# Patient Record
(demographics unavailable — no encounter records)

---

## 2024-12-03 NOTE — REASON FOR VISIT
[Arrhythmia/ECG Abnorrmalities] : arrhythmia/ECG abnormalities [Structural Heart and Valve Disease] : structural heart and valve disease [Hyperlipidemia] : hyperlipidemia [Hypertension] : hypertension [FreeTextEntry3] : F Borismo [FreeTextEntry1] : The patient is a 92-year-old white female who has a history for chronic/permanent atrial fibrillation (on XARELTO 15 mg daily), whose had a history of severe mitral insufficiency and congestive heart failure with LV dysfunction and required  "mitral valve clipping"-(;2019 [facility]);  Earlier in the year she was briefly hospitalized with some cold-like symptoms and diagnosed with having COVID-19 infection even though patient states she did not feel badly.  She was found to have atrial fibrillation with an increased ventricular rate and had an echocardiogram (9/13/2023) in [facility] which showed overall preserved left ventricular systolic function and normal ejection fraction 50 to 55%.  There was mild LVH.  There was 1-2+ AI and 2-3+ MR with mild TR and no pericardial effusion;  She returns to the office today for general cardiac follow-up.   Patient reports she had a more recent echocardiogram at her primary care's office;   She states she has been taking her medications regularly but was told to possibly "separate" the metoprolol succinate 50 mg. with 2 tablets in the morning and 1 tablet in the evening (she used to take 3 tablets - 150 mg-at once);  Patient reports that she continues to get some feelings of shortness of breath during certain activities, (especially today after she states she was completing some ice off of her windshield which made her very dyspneic-prior to driving here) -but denies chest pain, palpitations or dizziness-;

## 2024-12-03 NOTE — ASSESSMENT
[FreeTextEntry1] : EKG--atrial fibrillation with a moderate ventricular rate in the 70s.  There is a left anterior fascicular block pattern with late R wave transition across the precordial leads and nonspecific diffuse T wave changes; none acute;;  In summary, this 92-year-old woman has a history for chronic atrial fibrillation with controlled ventricular rate on anticoagulation as well without any overt bleeding or bruising; she has a history for cardiomyopathy likely secondary to valvular heart disease and mitral insufficiency for which she has had a mitral valve CLIP procedure in the past with residual-recurrent moderate to severe MR found at most recent echocardiogram from her primary care office;  Suspect shortness of breath is multifactorial as patient has been very sedentary and hardly does any physical activity or walking exercises.  Also underlying COPD and severe kyphoscoliosis with recurrent moderate to severe MR suspected contributing to her exertional dyspnea;   Plan:  ;I recommended to patient that we continue to try to manage her with medical therapy low-sodium diet reinforced;  To report any worsening significant dyspnea or orthopnea; . Patient to report any worsening chest tightness or shortness of breath;  Continue anticoagulation with low-dose Xarelto 15 mg daily;  Will repeat echocardiogram prior to next visit within 3 months and reassess valvulopathy.;  Have discussed with patient at length the need to cut back on certain physical chores and exertional activities;   Recommend regular checkups and laboratory blood tests with primary care;  Transthoracic echocardiogram prior to next visit within 4-5 months;

## 2024-12-03 NOTE — REVIEW OF SYSTEMS
[Feeling Fatigued] : feeling fatigued [Dyspnea on exertion] : dyspnea during exertion [Cough] : cough [Joint Pain] : joint pain [Joint Stiffness] : joint stiffness [Lower Back Pain] : lower back pain [Negative] : Heme/Lymph [FreeTextEntry2] : Alert, elderly white female ambulating with cane today, in no acute distress; [FreeTextEntry9] : Kyphoscoliosis of the spine

## 2024-12-03 NOTE — HISTORY OF PRESENT ILLNESS
[FreeTextEntry1] : Despite some complaints of exertional dyspnea at times, she continues to do a fair amount of physical activities going out to stores, up and down stairs in the house, household chores, and still driving her car;  She denies any PND or orthopnea.  Because of various arthritides especially of her lower back that seems to contribute to her fatigue and dyspnea during certain physical activities;   .Tolerating current medical regimen including Entresto 24/26 mg twice daily;  Although she ambulates with a cane and has chronic kyphoscoliosis of the spine --she continues to drive her own car and gets to the grocery store without much difficulty;  Recent echocardiogram from primary care office May 2, 2024 --demonstrated left ventricular systolic ejection fraction in the lower range of normal 50 to 55%.  There was at least moderate to severe mitral insufficiency detected;, mild TR and moderate left atrial enlargement noted.  No pericardial effusion;

## 2024-12-03 NOTE — PHYSICAL EXAM
[Well Developed] : well developed [Well Nourished] : well nourished [Normal Conjunctiva] : normal conjunctiva [Normal Venous Pressure] : normal venous pressure [No Carotid Bruit] : no carotid bruit [Normal S1, S2] : normal S1, S2 [No Rub] : no rub [Murmur] : murmur [Clear Lung Fields] : clear lung fields [No Respiratory Distress] : no respiratory distress  [Wheeze ____] : wheeze [unfilled] [Soft] : abdomen soft [Non Tender] : non-tender [No Masses/organomegaly] : no masses/organomegaly [Normal Bowel Sounds] : normal bowel sounds [Abnormal Gait] : abnormal gait [No Edema] : no edema [No Cyanosis] : no cyanosis [No Clubbing] : no clubbing [No Varicosities] : no varicosities [No Rash] : no rash [No Skin Lesions] : no skin lesions [Moves all extremities] : moves all extremities [No Focal Deficits] : no focal deficits [Normal Speech] : normal speech [Alert and Oriented] : alert and oriented [Normal memory] : normal memory [de-identified] : Alert, elderly white female ambulating with a cane, in no acute distress; [de-identified] : Irregular-irregular rhythm with, grade 1/6 systolic murmur ; [de-identified] : Slightly diminished breath sounds bilaterally but otherwise clear; [de-identified] : Ambulating with cane.  Has some kyphoscoliosis noted

## 2025-04-10 NOTE — ASSESSMENT
[FreeTextEntry1] : EKG--atrial fibrillation with a moderate ventricular rate in the 70s.  There is a left anterior fascicular block pattern with late R wave transition across the precordial leads and nonspecific diffuse T wave changes; none acute;;  In summary, this 93-year-old woman has a history for chronic atrial fibrillation with controlled ventricular rate on anticoagulation as well without any overt bleeding or bruising;  She has a history for cardiomyopathy likely secondary to valvular heart disease and mitral insufficiency for which she has had a mitral valve CLIP procedure in the past with residual-recurrent moderate MR found and at least moderate to severe AI now demonstrated with preserved left ventricular systolic function ;  Suspect shortness of breath is multifactorial as patient has been very sedentary and hardly does any physical activity or walking exercises.  Also underlying COPD and severe kyphoscoliosis with recurrent moderate  MR and at least moderate to severe AI suspected contributing to her exertional dyspnea; but no signs of overt heart failure;    Plan:  Recommend changing Lasix 40 mg daily to 20 mg daily;  Patient to report any worsening chest tightness or shortness of breath;  Continue anticoagulation with low-dose Xarelto 15 mg daily; continue current medical regimen including Entresto; at this time;   Consider evaluation from the pulmonary standpoint to rule out any pulmonary significant component of her shortness of breath;  Have discussed with patient at length the need to cut back on certain physical chores and exertional activities;   Recommend regular checkups and laboratory blood tests with primary care;  Follow-up visit within 3 to 4 months or as needed

## 2025-04-10 NOTE — PHYSICAL EXAM
[Well Developed] : well developed [Well Nourished] : well nourished [Normal Conjunctiva] : normal conjunctiva [Normal Venous Pressure] : normal venous pressure [No Carotid Bruit] : no carotid bruit [Normal S1, S2] : normal S1, S2 [No Rub] : no rub [Murmur] : murmur [Clear Lung Fields] : clear lung fields [No Respiratory Distress] : no respiratory distress  [Soft] : abdomen soft [Non Tender] : non-tender [No Masses/organomegaly] : no masses/organomegaly [Normal Bowel Sounds] : normal bowel sounds [Abnormal Gait] : abnormal gait [No Edema] : no edema [No Cyanosis] : no cyanosis [No Clubbing] : no clubbing [No Varicosities] : no varicosities [No Rash] : no rash [No Skin Lesions] : no skin lesions [Moves all extremities] : moves all extremities [No Focal Deficits] : no focal deficits [Normal Speech] : normal speech [Alert and Oriented] : alert and oriented [Normal memory] : normal memory [de-identified] : Alert, elderly white female ambulating with a cane, in no acute distress; [de-identified] : Irregular-irregular rhythm with, grade 1/6 systolic murmur ; [de-identified] : Slightly diminished breath sounds bilaterally at the bases -questionable few coarse breath sounds but otherwise clear; [de-identified] : Ambulating with cane.  Has some kyphoscoliosis noted

## 2025-04-10 NOTE — REASON FOR VISIT
[Arrhythmia/ECG Abnorrmalities] : arrhythmia/ECG abnormalities [Structural Heart and Valve Disease] : structural heart and valve disease [Hyperlipidemia] : hyperlipidemia [Hypertension] : hypertension [FreeTextEntry3] : F Borismo [FreeTextEntry1] : The patient is a 93-year-old white female who has a history for chronic/permanent atrial fibrillation (on XARELTO 15 mg daily), whose had a history of severe mitral insufficiency and congestive heart failure with LV dysfunction and required  "mitral valve clipping"-(;2019 [facility]);  Earlier in the year she was briefly hospitalized with some cold-like symptoms and diagnosed with having COVID-19 infection even though patient states she did not feel badly.  She was found to have atrial fibrillation with an increased ventricular rate and had an echocardiogram (9/13/2023) in [facility] which showed overall preserved left ventricular systolic function and normal ejection fraction 50 to 55%.  There was mild LVH.  There was 1-2+ AI and 2-3+ MR with mild TR and no pericardial effusion; she has also been on low-dose Xarelto 15 mg daily;  She returns to the office today for general cardiac follow-up and to review the results of most recent transthoracic echocardiogram;    She states she has been taking her medications regularly but was told to possibly "separate" the metoprolol succinate 50 mg. with 2 tablets in the morning and 1 tablet in the evening (she used to take 3 tablets - 150 mg-at once);  Her main complaint she reports is feeling winded or short of breath when she does any meaningful walking activities etc.  She denies PND or orthopnea.  She states her breathing has been about the same for a while.  There is been no chest pain, palpitations, dizziness or syncope;

## 2025-04-10 NOTE — HISTORY OF PRESENT ILLNESS
[FreeTextEntry1] : Despite complaints of exertional dyspnea at times, she continues to do a fair amount of physical activities going out to stores, up and down stairs in the house, household chores, and still driving her car; yet, she does ambulate with a cane and seems to have a fair amount of kyphoscoliosis of the spine;  She denies any PND or orthopnea.  Because of various arthritides especially of her lower back that seems to contribute to her fatigue and dyspnea during certain physical activities;   .Tolerating current medical regimen including Entresto 24/26 mg twice daily;   Transthoracic echocardiogram from 3/5/2025 shows preserved left ventricular size with normal systolic function and ejection fraction in the range of 65 to 70%; there is grade 1 diastolic dysfunction.  There is right ventricular enlargement.  Left atrium is severely enlarged and right atrium severely enlarged.  Mildly dilated ascending aorta at 4.20 cm.  Mild to moderate TR, and MR; moderate to severe AI; no pericardial effusion;

## 2025-07-17 NOTE — PHYSICAL EXAM
[Well Developed] : well developed [Well Nourished] : well nourished [Normal Conjunctiva] : normal conjunctiva [Normal Venous Pressure] : normal venous pressure [No Carotid Bruit] : no carotid bruit [Normal S1, S2] : normal S1, S2 [No Rub] : no rub [Murmur] : murmur [Clear Lung Fields] : clear lung fields [No Respiratory Distress] : no respiratory distress  [Soft] : abdomen soft [Non Tender] : non-tender [No Masses/organomegaly] : no masses/organomegaly [Normal Bowel Sounds] : normal bowel sounds [Abnormal Gait] : abnormal gait [No Edema] : no edema [No Cyanosis] : no cyanosis [No Clubbing] : no clubbing [No Varicosities] : no varicosities [No Rash] : no rash [No Skin Lesions] : no skin lesions [Moves all extremities] : moves all extremities [No Focal Deficits] : no focal deficits [Normal Speech] : normal speech [Alert and Oriented] : alert and oriented [Normal memory] : normal memory [de-identified] : Alert, elderly white female ambulating with a cane, in no acute distress; [de-identified] : Irregular-irregular rhythm with, grade 1/6 systolic murmur ; [de-identified] : Slightly diminished breath sounds bilaterally at the bases -questionable few coarse breath sounds but otherwise clear; [de-identified] : Ambulating with cane.  Has some kyphoscoliosis noted

## 2025-07-17 NOTE — HISTORY OF PRESENT ILLNESS
[FreeTextEntry1] : Despite complaints of exertional dyspnea at times, she continues to do a fair amount of physical activities going out to stores, up and down stairs in the house, household chores, and still driving her car; yet, she does ambulate with a cane and seems to have a fair amount of kyphoscoliosis of the spine;  Recently, while trying to get into her car and reaching to close the door she "fell out of the car onto the ground"- and was helped by her neighbors with some little bruising.  She denies any PND or orthopnea.  Because of various arthritides especially of her lower back, and kyphoscoliosis of her spine, that seems to contribute to her fatigue and dyspnea during certain physical activities;   .Tolerating current medical regimen including Entresto 24/26 mg twice daily; and low-dose Xarelto 15 mg daily;   Transthoracic echocardiogram from 3/5/2025 shows preserved left ventricular size with normal systolic function and ejection fraction in the range of 65 to 70%; there is grade 1 diastolic dysfunction.  There is right ventricular enlargement.  Left atrium is severely enlarged and right atrium severely enlarged.  Mildly dilated ascending aorta at 4.20 cm.  Mild to moderate TR, and MR; moderate to severe AI; no pericardial effusion;

## 2025-07-17 NOTE — REASON FOR VISIT
[Arrhythmia/ECG Abnorrmalities] : arrhythmia/ECG abnormalities [Structural Heart and Valve Disease] : structural heart and valve disease [Hyperlipidemia] : hyperlipidemia [Hypertension] : hypertension [FreeTextEntry3] : F Borismo [FreeTextEntry1] : The patient is a 93-year-old white female who has a history for chronic/permanent atrial fibrillation (on XARELTO 15 mg daily), whose had a history of severe mitral insufficiency and congestive heart failure with LV dysfunction and required  "mitral valve clipping"-(2019 [facility]);  (Earlier last year she was briefly hospitalized with some cold-like symptoms and diagnosed with having COVID-19 infection even though patient states she did not feel badly.  She was found to have atrial fibrillation with an increased ventricular rate and had an echocardiogram in [facility] which showed overall preserved left ventricular systolic function and normal ejection fraction 50 to 55%.  There was mild LVH.  There was 1-2+ AI and 2-3+ MR with mild TR and no pericardial effusion; she has also been on low-dose Xarelto 15 mg daily;  She continues to have some complaints of exertional dyspnea at times but states that she is about "the same as previous".  There has been no PND or orthopnea but yet she states she does not "sleep well".  She returns to the office today for general cardiac follow-up;  Her main complaint she reports is feeling winded or short of breath when she does any meaningful walking activities etc.  She denies PND or orthopnea.  She states her breathing has been about the same for a while.  There has been no chest pain, palpitations, dizziness or syncope;

## 2025-07-22 NOTE — ASSESSMENT
[FreeTextEntry1] : 93 F w/ extensive cardiac disease including Afib (on Xarelto), severe MR s/p clip, HFpEF, mod-severe AI, never smoker, severe osteoporosis and kyphoscoliosis, referred to pulmonary by cardiology Dr. Bains for pulmonary evaluation given ongoing MCDONALD. MCDONALD is likely multifactorial including cardiac disease as above, restrictive lung disease from kyphoscoliosis, deconditioning, and advanced age. Unlikely to have obstructive lung disease component but will evaluate.   - CXR to evaluate for lung parenchymal disease and pleural effusions (had small L effusion in 2024) - PFT - CBC, CMP, BNP - May also have PH but not evidenced on TTE. May need RHC if BNP is significantly elevated. Pt has severe RA dilation but moderate TR. - RTC in 6 weeks after above

## 2025-07-22 NOTE — HISTORY OF PRESENT ILLNESS
[Never] : never [TextBox_4] : 93 F w/ extensive cardiac disease including Afib (on Xarelto), severe MR s/p clip, HFpEF, mod-severe AI, never smoker, severe osteoporosis and kyphoscoliosis, referred to pulmonary by cardiology Dr. Bains for pulmonary evaluation given ongoing MCDONALD. Pt's daughter is present providing history as well.  She reports when she walks from her den to her kitchen which is only a few steps, she gets short of breath and has to stop and sit down to catch her breath. No wheezing. No cough or mucus congestion. Some LE edema. No seasonal allergies. Never had asthma growing up.  Used to work as a  at [employer] and retired at age 79.  Was prescribed an inhaler and a nebulizer in the past which didn't help much and she wasn't very interested in using it.  Had COVID in the past while she was hospitalized for what seems to be a non-pulmonary reason.